# Patient Record
Sex: MALE | Race: WHITE | NOT HISPANIC OR LATINO | Employment: UNEMPLOYED | URBAN - METROPOLITAN AREA
[De-identification: names, ages, dates, MRNs, and addresses within clinical notes are randomized per-mention and may not be internally consistent; named-entity substitution may affect disease eponyms.]

---

## 2017-03-19 PROBLEM — S82.892B OPEN LEFT ANKLE FRACTURE: Status: ACTIVE | Noted: 2017-03-19

## 2018-03-21 PROBLEM — M51.369 DDD (DEGENERATIVE DISC DISEASE), LUMBAR: Status: ACTIVE | Noted: 2018-03-21

## 2018-03-21 PROBLEM — Z78.9 ALCOHOL USE: Status: ACTIVE | Noted: 2018-03-21

## 2018-03-21 PROBLEM — M43.10 PARS DEFECT WITH SPONDYLOLISTHESIS: Status: ACTIVE | Noted: 2018-03-21

## 2018-03-21 PROBLEM — M54.16 LUMBAR RADICULOPATHY: Status: ACTIVE | Noted: 2018-03-21

## 2018-03-21 PROBLEM — Z72.0 TOBACCO USE: Status: ACTIVE | Noted: 2018-03-21

## 2018-03-21 PROBLEM — M51.36 DDD (DEGENERATIVE DISC DISEASE), LUMBAR: Status: ACTIVE | Noted: 2018-03-21

## 2019-01-15 PROBLEM — J93.9 PNEUMOTHORAX: Status: ACTIVE | Noted: 2019-01-15

## 2019-01-15 PROBLEM — S22.49XA RIB FRACTURES: Status: ACTIVE | Noted: 2019-01-15

## 2020-02-07 PROBLEM — F10.10 ALCOHOL ABUSE: Chronic | Status: ACTIVE | Noted: 2018-03-21

## 2020-02-07 PROBLEM — R74.01 TRANSAMINITIS: Status: ACTIVE | Noted: 2020-02-07

## 2020-02-07 PROBLEM — E83.42 HYPOMAGNESEMIA: Status: ACTIVE | Noted: 2020-02-07

## 2020-02-07 PROBLEM — F10.929 ALCOHOL INTOXICATION (HCC): Status: ACTIVE | Noted: 2020-02-07

## 2020-02-07 PROBLEM — R79.89 ELEVATED LFTS: Status: ACTIVE | Noted: 2020-02-07

## 2020-02-07 PROBLEM — D75.89 MACROCYTOSIS: Status: ACTIVE | Noted: 2020-02-07

## 2020-02-07 PROBLEM — S22.49XA RIB FRACTURES: Status: RESOLVED | Noted: 2019-01-15 | Resolved: 2020-02-07

## 2020-02-07 PROBLEM — S82.892B OPEN LEFT ANKLE FRACTURE: Status: RESOLVED | Noted: 2017-03-19 | Resolved: 2020-02-07

## 2020-02-07 PROBLEM — D75.89 MACROCYTOSIS: Chronic | Status: ACTIVE | Noted: 2020-02-07

## 2020-02-07 PROBLEM — K85.90 PANCREATITIS: Status: ACTIVE | Noted: 2020-02-07

## 2020-02-07 PROBLEM — D69.6 THROMBOCYTOPENIA (HCC): Chronic | Status: ACTIVE | Noted: 2020-02-07

## 2020-02-08 PROBLEM — R16.0 HEPATOMEGALY: Chronic | Status: ACTIVE | Noted: 2020-02-08

## 2020-02-08 PROBLEM — J93.9 PNEUMOTHORAX: Status: RESOLVED | Noted: 2019-01-15 | Resolved: 2020-02-08

## 2020-02-08 PROBLEM — K85.20 ALCOHOLIC PANCREATITIS: Status: ACTIVE | Noted: 2020-02-07

## 2020-02-08 PROBLEM — F10.929 ALCOHOL INTOXICATION (HCC): Status: RESOLVED | Noted: 2020-02-07 | Resolved: 2020-02-08

## 2020-03-26 PROBLEM — R33.9 URINARY RETENTION: Status: ACTIVE | Noted: 2020-03-26

## 2020-03-30 PROBLEM — F10.931 ALCOHOL WITHDRAWAL SYNDROME, WITH DELIRIUM (HCC): Status: ACTIVE | Noted: 2020-03-30

## 2020-07-04 PROBLEM — R74.01 TRANSAMINITIS: Status: RESOLVED | Noted: 2020-02-07 | Resolved: 2020-07-04

## 2020-07-04 PROBLEM — F10.931 ALCOHOL WITHDRAWAL SYNDROME, WITH DELIRIUM (HCC): Status: RESOLVED | Noted: 2020-03-30 | Resolved: 2020-07-04

## 2020-07-04 PROBLEM — K85.20 ALCOHOLIC PANCREATITIS: Status: RESOLVED | Noted: 2020-02-07 | Resolved: 2020-07-04

## 2020-07-26 PROBLEM — F10.930 ALCOHOL WITHDRAWAL SYNDROME WITHOUT COMPLICATION (HCC): Status: ACTIVE | Noted: 2020-03-30

## 2020-07-26 PROBLEM — R79.89 ELEVATED LACTIC ACID LEVEL: Status: ACTIVE | Noted: 2020-07-26

## 2020-07-28 PROBLEM — F10.930 ALCOHOL WITHDRAWAL SYNDROME WITHOUT COMPLICATION (HCC): Status: RESOLVED | Noted: 2020-03-30 | Resolved: 2020-07-28

## 2020-07-28 PROBLEM — R79.89 ELEVATED LACTIC ACID LEVEL: Status: RESOLVED | Noted: 2020-07-26 | Resolved: 2020-07-28

## 2021-01-26 PROBLEM — E83.42 HYPOMAGNESEMIA: Status: RESOLVED | Noted: 2020-02-07 | Resolved: 2021-01-26

## 2021-01-26 PROBLEM — K85.20 ALCOHOL-INDUCED ACUTE PANCREATITIS WITHOUT INFECTION OR NECROSIS: Status: RESOLVED | Noted: 2020-02-07 | Resolved: 2021-01-26

## 2021-04-21 PROBLEM — K85.90 PANCREATITIS: Status: ACTIVE | Noted: 2021-04-21

## 2021-05-17 PROBLEM — T81.49XA SURGICAL WOUND INFECTION: Status: ACTIVE | Noted: 2021-05-17

## 2021-05-19 PROBLEM — R33.9 RETENTION OF URINE: Status: RESOLVED | Noted: 2020-03-26 | Resolved: 2021-05-19

## 2021-12-20 PROBLEM — K85.90 ACUTE PANCREATITIS WITHOUT INFECTION OR NECROSIS: Status: ACTIVE | Noted: 2021-12-20

## 2021-12-30 PROBLEM — F10.20 ALCOHOL USE DISORDER, SEVERE, DEPENDENCE (HCC): Status: ACTIVE | Noted: 2021-12-30

## 2021-12-30 PROBLEM — K85.90 PANCREATITIS: Status: RESOLVED | Noted: 2021-04-21 | Resolved: 2021-12-30

## 2021-12-30 PROBLEM — K85.90 ACUTE PANCREATITIS WITHOUT INFECTION OR NECROSIS: Status: RESOLVED | Noted: 2021-12-20 | Resolved: 2021-12-30

## 2021-12-30 PROBLEM — K85.90 RECURRENT ACUTE PANCREATITIS: Status: ACTIVE | Noted: 2021-12-30

## 2021-12-30 PROBLEM — T81.49XA SURGICAL WOUND INFECTION: Status: RESOLVED | Noted: 2021-05-17 | Resolved: 2021-12-30

## 2021-12-30 PROBLEM — D69.6 THROMBOCYTOPENIA (HCC): Chronic | Status: RESOLVED | Noted: 2020-02-07 | Resolved: 2021-12-30

## 2021-12-30 PROBLEM — R74.01 TRANSAMINITIS: Status: RESOLVED | Noted: 2020-02-07 | Resolved: 2021-12-30

## 2021-12-30 PROBLEM — K85.20 ALCOHOL-INDUCED ACUTE PANCREATITIS WITHOUT INFECTION OR NECROSIS: Status: RESOLVED | Noted: 2020-02-07 | Resolved: 2021-12-30

## 2021-12-30 PROBLEM — F10.10 ALCOHOL ABUSE: Chronic | Status: RESOLVED | Noted: 2018-03-21 | Resolved: 2021-12-30

## 2021-12-30 PROBLEM — D75.89 MACROCYTOSIS: Chronic | Status: RESOLVED | Noted: 2020-02-07 | Resolved: 2021-12-30

## 2021-12-30 PROBLEM — R16.0 HEPATOMEGALY: Chronic | Status: RESOLVED | Noted: 2020-02-08 | Resolved: 2021-12-30

## 2022-05-02 PROBLEM — K85.90 ACUTE PANCREATITIS WITHOUT INFECTION OR NECROSIS: Status: ACTIVE | Noted: 2022-05-02

## 2022-06-09 PROBLEM — K85.20 ALCOHOL-INDUCED ACUTE PANCREATITIS, UNSPECIFIED COMPLICATION STATUS: Status: ACTIVE | Noted: 2022-06-09

## 2022-09-15 PROBLEM — K85.80 OTHER ACUTE PANCREATITIS WITHOUT INFECTION OR NECROSIS: Status: ACTIVE | Noted: 2022-09-15

## 2022-09-15 PROBLEM — K85.80 OTHER ACUTE PANCREATITIS WITHOUT INFECTION OR NECROSIS: Status: RESOLVED | Noted: 2022-09-15 | Resolved: 2022-09-15

## 2022-11-08 PROBLEM — F41.9 ANXIETY AND DEPRESSION: Status: ACTIVE | Noted: 2022-11-08

## 2022-11-08 PROBLEM — F32.A ANXIETY AND DEPRESSION: Status: ACTIVE | Noted: 2022-11-08

## 2022-12-03 PROBLEM — R11.2 N&V (NAUSEA AND VOMITING): Status: ACTIVE | Noted: 2022-12-03

## 2022-12-03 PROBLEM — K85.20 ALCOHOL INDUCED ACUTE PANCREATITIS WITHOUT NECROSIS OR INFECTION: Status: ACTIVE | Noted: 2022-12-03

## 2022-12-04 PROBLEM — K85.20 ALCOHOL INDUCED ACUTE PANCREATITIS WITHOUT NECROSIS OR INFECTION: Status: RESOLVED | Noted: 2022-12-03 | Resolved: 2022-12-04

## 2022-12-04 PROBLEM — R11.2 N&V (NAUSEA AND VOMITING): Status: RESOLVED | Noted: 2022-12-03 | Resolved: 2022-12-04

## 2022-12-17 PROBLEM — F10.929 CHRONIC PANCREATITIS DUE TO ACUTE ALCOHOL INTOXICATION (HCC): Status: ACTIVE | Noted: 2022-12-17

## 2022-12-17 PROBLEM — F10.929 CHRONIC PANCREATITIS DUE TO ACUTE ALCOHOL INTOXICATION (HCC): Status: RESOLVED | Noted: 2022-12-17 | Resolved: 2022-12-17

## 2022-12-17 PROBLEM — K86.0 CHRONIC PANCREATITIS DUE TO ACUTE ALCOHOL INTOXICATION (HCC): Status: ACTIVE | Noted: 2022-12-17

## 2022-12-17 PROBLEM — K86.0 CHRONIC PANCREATITIS DUE TO ACUTE ALCOHOL INTOXICATION (HCC): Status: RESOLVED | Noted: 2022-12-17 | Resolved: 2022-12-17
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
## 2023-01-05 PROBLEM — S51.802A OPEN WOUND OF LEFT FOREARM: Status: ACTIVE | Noted: 2023-01-05

## 2023-01-05 PROBLEM — S55.112A LACERATION OF LEFT RADIAL ARTERY: Status: ACTIVE | Noted: 2023-01-05

## 2023-01-05 PROBLEM — F10.920 ALCOHOL INTOXICATION, UNCOMPLICATED (HCC): Status: ACTIVE | Noted: 2023-01-05

## 2023-01-06 PROBLEM — F10.929 ALCOHOL INTOXICATION (HCC): Status: RESOLVED | Noted: 2020-02-07 | Resolved: 2023-01-06

## 2023-01-06 PROBLEM — F10.920 ALCOHOL INTOXICATION, UNCOMPLICATED (HCC): Status: RESOLVED | Noted: 2023-01-05 | Resolved: 2023-01-06

## 2023-01-06 PROBLEM — K85.90 ACUTE PANCREATITIS WITHOUT INFECTION OR NECROSIS: Status: RESOLVED | Noted: 2022-05-02 | Resolved: 2023-01-06

## 2024-01-02 PROBLEM — K86.1 ACUTE ON CHRONIC PANCREATITIS (HCC): Status: ACTIVE | Noted: 2024-01-02

## 2024-01-02 PROBLEM — K85.90 ACUTE ON CHRONIC PANCREATITIS (HCC): Status: ACTIVE | Noted: 2024-01-02

## 2024-04-26 PROBLEM — S92.009A CALCANEUS FRACTURE: Status: ACTIVE | Noted: 2024-04-26

## 2024-05-07 PROBLEM — S92.032A DISPLACED AVULSION FRACTURE OF TUBEROSITY OF LEFT CALCANEUS, INITIAL ENCOUNTER FOR CLOSED FRACTURE: Status: ACTIVE | Noted: 2024-05-07

## 2024-12-29 ENCOUNTER — APPOINTMENT (OUTPATIENT)
Dept: RADIOLOGY | Facility: MEDICAL CENTER | Age: 34
End: 2024-12-29
Attending: INTERNAL MEDICINE
Payer: COMMERCIAL

## 2024-12-29 ENCOUNTER — HOSPITAL ENCOUNTER (INPATIENT)
Facility: MEDICAL CENTER | Age: 34
LOS: 1 days | End: 2024-12-30
Attending: INTERNAL MEDICINE | Admitting: INTERNAL MEDICINE
Payer: COMMERCIAL

## 2024-12-29 DIAGNOSIS — K85.90 ACUTE ON CHRONIC PANCREATITIS (HCC): ICD-10-CM

## 2024-12-29 DIAGNOSIS — K86.1 ACUTE ON CHRONIC PANCREATITIS (HCC): ICD-10-CM

## 2024-12-29 PROBLEM — H54.62 VISION LOSS, LEFT EYE: Status: ACTIVE | Noted: 2024-12-29

## 2024-12-29 PROBLEM — H16.9 KERATITIS, LEFT: Status: ACTIVE | Noted: 2024-12-29

## 2024-12-29 PROBLEM — F19.90 DRUG USE: Status: ACTIVE | Noted: 2024-12-29

## 2024-12-29 PROBLEM — D64.9 ANEMIA: Status: ACTIVE | Noted: 2024-12-29

## 2024-12-29 PROBLEM — H18.829 CONTACT LENS OVERWEAR: Status: ACTIVE | Noted: 2024-12-29

## 2024-12-29 PROBLEM — H16.002 UNSPECIFIED CORNEAL ULCER, LEFT EYE: Status: ACTIVE | Noted: 2024-12-29

## 2024-12-29 PROBLEM — H16.9 KERATITIS, LEFT: Status: RESOLVED | Noted: 2024-12-29 | Resolved: 2024-12-29

## 2024-12-29 PROBLEM — D64.9 ANEMIA: Status: RESOLVED | Noted: 2024-12-29 | Resolved: 2024-12-29

## 2024-12-29 PROBLEM — F19.10 SUBSTANCE ABUSE (HCC): Status: ACTIVE | Noted: 2024-12-29

## 2024-12-29 PROBLEM — Z59.9 FINANCIAL DIFFICULTIES: Status: ACTIVE | Noted: 2024-12-29

## 2024-12-29 LAB
AMPHET UR QL SCN: POSITIVE
BARBITURATES UR QL SCN: NEGATIVE
BENZODIAZ UR QL SCN: NEGATIVE
BZE UR QL SCN: NEGATIVE
CANNABINOIDS UR QL SCN: POSITIVE
FENTANYL UR QL: NEGATIVE
METHADONE UR QL SCN: NEGATIVE
OPIATES UR QL SCN: NEGATIVE
OXYCODONE UR QL SCN: POSITIVE
PCP UR QL SCN: NEGATIVE
PHOSPHATE SERPL-MCNC: 3.3 MG/DL (ref 2.5–4.5)
PROPOXYPH UR QL SCN: NEGATIVE

## 2024-12-29 PROCEDURE — A9270 NON-COVERED ITEM OR SERVICE: HCPCS | Performed by: INTERNAL MEDICINE

## 2024-12-29 PROCEDURE — 700102 HCHG RX REV CODE 250 W/ 637 OVERRIDE(OP): Performed by: INTERNAL MEDICINE

## 2024-12-29 PROCEDURE — 84100 ASSAY OF PHOSPHORUS: CPT

## 2024-12-29 PROCEDURE — 36415 COLL VENOUS BLD VENIPUNCTURE: CPT

## 2024-12-29 PROCEDURE — 700101 HCHG RX REV CODE 250: Performed by: INTERNAL MEDICINE

## 2024-12-29 PROCEDURE — 700105 HCHG RX REV CODE 258

## 2024-12-29 PROCEDURE — 700111 HCHG RX REV CODE 636 W/ 250 OVERRIDE (IP): Performed by: INTERNAL MEDICINE

## 2024-12-29 PROCEDURE — 76705 ECHO EXAM OF ABDOMEN: CPT

## 2024-12-29 PROCEDURE — 700105 HCHG RX REV CODE 258: Performed by: INTERNAL MEDICINE

## 2024-12-29 PROCEDURE — 99232 SBSQ HOSP IP/OBS MODERATE 35: CPT | Performed by: INTERNAL MEDICINE

## 2024-12-29 PROCEDURE — 99223 1ST HOSP IP/OBS HIGH 75: CPT | Performed by: STUDENT IN AN ORGANIZED HEALTH CARE EDUCATION/TRAINING PROGRAM

## 2024-12-29 PROCEDURE — 99406 BEHAV CHNG SMOKING 3-10 MIN: CPT | Performed by: STUDENT IN AN ORGANIZED HEALTH CARE EDUCATION/TRAINING PROGRAM

## 2024-12-29 PROCEDURE — 700111 HCHG RX REV CODE 636 W/ 250 OVERRIDE (IP)

## 2024-12-29 PROCEDURE — 700102 HCHG RX REV CODE 250 W/ 637 OVERRIDE(OP)

## 2024-12-29 PROCEDURE — 80307 DRUG TEST PRSMV CHEM ANLYZR: CPT

## 2024-12-29 PROCEDURE — A9270 NON-COVERED ITEM OR SERVICE: HCPCS

## 2024-12-29 PROCEDURE — 770001 HCHG ROOM/CARE - MED/SURG/GYN PRIV*

## 2024-12-29 PROCEDURE — 700111 HCHG RX REV CODE 636 W/ 250 OVERRIDE (IP): Mod: JZ

## 2024-12-29 RX ORDER — LORAZEPAM 2 MG/ML
1 INJECTION INTRAMUSCULAR
Status: DISCONTINUED | OUTPATIENT
Start: 2024-12-29 | End: 2024-12-29

## 2024-12-29 RX ORDER — SODIUM CHLORIDE, SODIUM LACTATE, POTASSIUM CHLORIDE, CALCIUM CHLORIDE 600; 310; 30; 20 MG/100ML; MG/100ML; MG/100ML; MG/100ML
2000 INJECTION, SOLUTION INTRAVENOUS CONTINUOUS
Status: DISCONTINUED | OUTPATIENT
Start: 2024-12-29 | End: 2024-12-29

## 2024-12-29 RX ORDER — LABETALOL HYDROCHLORIDE 5 MG/ML
10 INJECTION, SOLUTION INTRAVENOUS EVERY 4 HOURS PRN
Status: DISCONTINUED | OUTPATIENT
Start: 2024-12-29 | End: 2024-12-30 | Stop reason: HOSPADM

## 2024-12-29 RX ORDER — ENOXAPARIN SODIUM 100 MG/ML
40 INJECTION SUBCUTANEOUS DAILY
Status: DISCONTINUED | OUTPATIENT
Start: 2024-12-29 | End: 2024-12-30 | Stop reason: HOSPADM

## 2024-12-29 RX ORDER — ACETAMINOPHEN 500 MG
1000 TABLET ORAL EVERY 6 HOURS
Status: DISCONTINUED | OUTPATIENT
Start: 2024-12-29 | End: 2024-12-30 | Stop reason: HOSPADM

## 2024-12-29 RX ORDER — AMOXICILLIN 250 MG
2 CAPSULE ORAL EVERY EVENING
Status: DISCONTINUED | OUTPATIENT
Start: 2024-12-29 | End: 2024-12-30 | Stop reason: HOSPADM

## 2024-12-29 RX ORDER — OXYCODONE HYDROCHLORIDE 5 MG/1
5 TABLET ORAL
Status: DISCONTINUED | OUTPATIENT
Start: 2024-12-29 | End: 2024-12-30 | Stop reason: HOSPADM

## 2024-12-29 RX ORDER — GAUZE BANDAGE 2" X 2"
200 BANDAGE TOPICAL 2 TIMES DAILY
Status: DISCONTINUED | OUTPATIENT
Start: 2025-01-01 | End: 2024-12-30 | Stop reason: HOSPADM

## 2024-12-29 RX ORDER — LORAZEPAM 2 MG/ML
2 INJECTION INTRAMUSCULAR
Status: DISCONTINUED | OUTPATIENT
Start: 2024-12-29 | End: 2024-12-29

## 2024-12-29 RX ORDER — LORAZEPAM 2 MG/ML
1.5 INJECTION INTRAMUSCULAR
Status: DISCONTINUED | OUTPATIENT
Start: 2024-12-29 | End: 2024-12-29

## 2024-12-29 RX ORDER — FOLIC ACID 1 MG/1
1 TABLET ORAL DAILY
Status: DISCONTINUED | OUTPATIENT
Start: 2024-12-29 | End: 2024-12-29

## 2024-12-29 RX ORDER — LORAZEPAM 1 MG/1
1 TABLET ORAL EVERY 4 HOURS PRN
Status: DISCONTINUED | OUTPATIENT
Start: 2024-12-29 | End: 2024-12-29

## 2024-12-29 RX ORDER — ACETAMINOPHEN 325 MG/1
650 TABLET ORAL EVERY 6 HOURS PRN
Status: DISCONTINUED | OUTPATIENT
Start: 2024-12-29 | End: 2024-12-29

## 2024-12-29 RX ORDER — POLYETHYLENE GLYCOL 3350 17 G/17G
1 POWDER, FOR SOLUTION ORAL
Status: DISCONTINUED | OUTPATIENT
Start: 2024-12-29 | End: 2024-12-30 | Stop reason: HOSPADM

## 2024-12-29 RX ORDER — LORAZEPAM 2 MG/1
4 TABLET ORAL
Status: DISCONTINUED | OUTPATIENT
Start: 2024-12-29 | End: 2024-12-29

## 2024-12-29 RX ORDER — LORAZEPAM 2 MG/ML
0.5 INJECTION INTRAMUSCULAR EVERY 4 HOURS PRN
Status: DISCONTINUED | OUTPATIENT
Start: 2024-12-29 | End: 2024-12-29

## 2024-12-29 RX ORDER — NICOTINE 21 MG/24HR
21 PATCH, TRANSDERMAL 24 HOURS TRANSDERMAL
Status: DISCONTINUED | OUTPATIENT
Start: 2024-12-29 | End: 2024-12-30 | Stop reason: HOSPADM

## 2024-12-29 RX ORDER — ACETAMINOPHEN 500 MG
1000 TABLET ORAL EVERY 6 HOURS PRN
Status: DISCONTINUED | OUTPATIENT
Start: 2025-01-03 | End: 2024-12-30 | Stop reason: HOSPADM

## 2024-12-29 RX ORDER — LORAZEPAM 2 MG/1
2 TABLET ORAL
Status: DISCONTINUED | OUTPATIENT
Start: 2024-12-29 | End: 2024-12-29

## 2024-12-29 RX ORDER — HYDROMORPHONE HYDROCHLORIDE 1 MG/ML
0.5 INJECTION, SOLUTION INTRAMUSCULAR; INTRAVENOUS; SUBCUTANEOUS
Status: DISCONTINUED | OUTPATIENT
Start: 2024-12-29 | End: 2024-12-30 | Stop reason: HOSPADM

## 2024-12-29 RX ORDER — SODIUM CHLORIDE, SODIUM LACTATE, POTASSIUM CHLORIDE, CALCIUM CHLORIDE 600; 310; 30; 20 MG/100ML; MG/100ML; MG/100ML; MG/100ML
INJECTION, SOLUTION INTRAVENOUS CONTINUOUS
Status: DISCONTINUED | OUTPATIENT
Start: 2024-12-29 | End: 2024-12-30

## 2024-12-29 RX ORDER — MOXIFLOXACIN 5 MG/ML
1 SOLUTION/ DROPS OPHTHALMIC
Status: DISCONTINUED | OUTPATIENT
Start: 2024-12-29 | End: 2024-12-29

## 2024-12-29 RX ORDER — GAUZE BANDAGE 2" X 2"
100 BANDAGE TOPICAL DAILY
Status: DISCONTINUED | OUTPATIENT
Start: 2024-12-29 | End: 2024-12-29

## 2024-12-29 RX ORDER — OXYCODONE HYDROCHLORIDE 5 MG/1
5 TABLET ORAL
Status: DISCONTINUED | OUTPATIENT
Start: 2024-12-29 | End: 2024-12-29

## 2024-12-29 RX ORDER — OXYCODONE HYDROCHLORIDE 5 MG/1
2.5 TABLET ORAL
Status: DISCONTINUED | OUTPATIENT
Start: 2024-12-29 | End: 2024-12-29

## 2024-12-29 RX ORDER — LORAZEPAM 1 MG/1
0.5 TABLET ORAL EVERY 4 HOURS PRN
Status: DISCONTINUED | OUTPATIENT
Start: 2024-12-29 | End: 2024-12-29

## 2024-12-29 RX ORDER — MOXIFLOXACIN 5 MG/ML
1 SOLUTION/ DROPS OPHTHALMIC
Status: DISCONTINUED | OUTPATIENT
Start: 2024-12-29 | End: 2024-12-30 | Stop reason: HOSPADM

## 2024-12-29 RX ORDER — ERYTHROMYCIN 5 MG/G
1 OINTMENT OPHTHALMIC
Status: DISCONTINUED | OUTPATIENT
Start: 2024-12-29 | End: 2024-12-30 | Stop reason: HOSPADM

## 2024-12-29 RX ORDER — HYDROMORPHONE HYDROCHLORIDE 1 MG/ML
0.25 INJECTION, SOLUTION INTRAMUSCULAR; INTRAVENOUS; SUBCUTANEOUS
Status: DISCONTINUED | OUTPATIENT
Start: 2024-12-29 | End: 2024-12-29

## 2024-12-29 RX ORDER — TOBRAMYCIN 3 MG/ML
1 SOLUTION/ DROPS OPHTHALMIC EVERY 4 HOURS
Status: DISCONTINUED | OUTPATIENT
Start: 2024-12-29 | End: 2024-12-29

## 2024-12-29 RX ORDER — OXYCODONE HYDROCHLORIDE 10 MG/1
10 TABLET ORAL
Status: DISCONTINUED | OUTPATIENT
Start: 2024-12-29 | End: 2024-12-30 | Stop reason: HOSPADM

## 2024-12-29 RX ADMIN — MOXIFLOXACIN 1 DROP: 5 SOLUTION/ DROPS OPHTHALMIC at 18:44

## 2024-12-29 RX ADMIN — HYDROMORPHONE HYDROCHLORIDE 0.5 MG: 1 INJECTION, SOLUTION INTRAMUSCULAR; INTRAVENOUS; SUBCUTANEOUS at 08:40

## 2024-12-29 RX ADMIN — OXYCODONE 5 MG: 5 TABLET ORAL at 07:36

## 2024-12-29 RX ADMIN — OXYCODONE 5 MG: 5 TABLET ORAL at 04:14

## 2024-12-29 RX ADMIN — ENOXAPARIN SODIUM 40 MG: 100 INJECTION SUBCUTANEOUS at 02:47

## 2024-12-29 RX ADMIN — HYDROMORPHONE HYDROCHLORIDE 0.5 MG: 1 INJECTION, SOLUTION INTRAMUSCULAR; INTRAVENOUS; SUBCUTANEOUS at 13:18

## 2024-12-29 RX ADMIN — ENOXAPARIN SODIUM 40 MG: 100 INJECTION SUBCUTANEOUS at 16:20

## 2024-12-29 RX ADMIN — OXYCODONE HYDROCHLORIDE 10 MG: 10 TABLET ORAL at 16:16

## 2024-12-29 RX ADMIN — FOLIC ACID 1 MG: 1 TABLET ORAL at 05:04

## 2024-12-29 RX ADMIN — NICOTINE TRANSDERMAL SYSTEM 21 MG: 21 PATCH, EXTENDED RELEASE TRANSDERMAL at 09:40

## 2024-12-29 RX ADMIN — OXYCODONE HYDROCHLORIDE 10 MG: 10 TABLET ORAL at 20:43

## 2024-12-29 RX ADMIN — MOXIFLOXACIN 1 DROP: 5 SOLUTION/ DROPS OPHTHALMIC at 16:16

## 2024-12-29 RX ADMIN — OXYCODONE HYDROCHLORIDE 10 MG: 10 TABLET ORAL at 12:04

## 2024-12-29 RX ADMIN — Medication 100 MG: at 05:05

## 2024-12-29 RX ADMIN — THERA TABS 1 TABLET: TAB at 08:24

## 2024-12-29 RX ADMIN — SODIUM CHLORIDE, POTASSIUM CHLORIDE, SODIUM LACTATE AND CALCIUM CHLORIDE: 600; 310; 30; 20 INJECTION, SOLUTION INTRAVENOUS at 16:19

## 2024-12-29 RX ADMIN — HYDROMORPHONE HYDROCHLORIDE 0.25 MG: 1 INJECTION, SOLUTION INTRAMUSCULAR; INTRAVENOUS; SUBCUTANEOUS at 05:22

## 2024-12-29 RX ADMIN — THIAMINE HYDROCHLORIDE 400 MG: 100 INJECTION, SOLUTION INTRAMUSCULAR; INTRAVENOUS at 16:20

## 2024-12-29 RX ADMIN — MOXIFLOXACIN 1 DROP: 5 SOLUTION/ DROPS OPHTHALMIC at 22:21

## 2024-12-29 RX ADMIN — THERA TABS 1 TABLET: TAB at 05:04

## 2024-12-29 RX ADMIN — ERYTHROMYCIN 1 APPLICATION: 5 OINTMENT OPHTHALMIC at 20:43

## 2024-12-29 RX ADMIN — SODIUM CHLORIDE, POTASSIUM CHLORIDE, SODIUM LACTATE AND CALCIUM CHLORIDE 1000 ML: 600; 310; 30; 20 INJECTION, SOLUTION INTRAVENOUS at 03:38

## 2024-12-29 RX ADMIN — MOXIFLOXACIN 1 DROP: 5 SOLUTION/ DROPS OPHTHALMIC at 13:35

## 2024-12-29 RX ADMIN — THIAMINE HYDROCHLORIDE 400 MG: 100 INJECTION, SOLUTION INTRAMUSCULAR; INTRAVENOUS at 08:27

## 2024-12-29 RX ADMIN — THIAMINE HYDROCHLORIDE 400 MG: 100 INJECTION, SOLUTION INTRAMUSCULAR; INTRAVENOUS at 20:47

## 2024-12-29 RX ADMIN — SODIUM CHLORIDE, POTASSIUM CHLORIDE, SODIUM LACTATE AND CALCIUM CHLORIDE: 600; 310; 30; 20 INJECTION, SOLUTION INTRAVENOUS at 08:21

## 2024-12-29 RX ADMIN — TOBRAMYCIN OPHTHALMIC SOLUTION 1 DROP: 3 SOLUTION/ DROPS OPHTHALMIC at 08:22

## 2024-12-29 SDOH — ECONOMIC STABILITY: TRANSPORTATION INSECURITY
IN THE PAST 12 MONTHS, HAS LACK OF RELIABLE TRANSPORTATION KEPT YOU FROM MEDICAL APPOINTMENTS, MEETINGS, WORK OR FROM GETTING THINGS NEEDED FOR DAILY LIVING?: NO

## 2024-12-29 SDOH — ECONOMIC STABILITY: TRANSPORTATION INSECURITY
IN THE PAST 12 MONTHS, HAS THE LACK OF TRANSPORTATION KEPT YOU FROM MEDICAL APPOINTMENTS OR FROM GETTING MEDICATIONS?: NO

## 2024-12-29 ASSESSMENT — COGNITIVE AND FUNCTIONAL STATUS - GENERAL
SUGGESTED CMS G CODE MODIFIER MOBILITY: CH
MOBILITY SCORE: 24
SUGGESTED CMS G CODE MODIFIER DAILY ACTIVITY: CH
DAILY ACTIVITIY SCORE: 24

## 2024-12-29 ASSESSMENT — LIFESTYLE VARIABLES
AVERAGE NUMBER OF DAYS PER WEEK YOU HAVE A DRINK CONTAINING ALCOHOL: 2
TOTAL SCORE: 3
TOTAL SCORE: 1
AGITATION: NORMAL ACTIVITY
NAUSEA AND VOMITING: MILD NAUSEA WITH NO VOMITING
AUDITORY DISTURBANCES: NOT PRESENT
TOTAL SCORE: 3
HEADACHE, FULLNESS IN HEAD: NOT PRESENT
ANXIETY: NO ANXIETY (AT EASE)
VISUAL DISTURBANCES: NOT PRESENT
TOTAL SCORE: 2
ORIENTATION AND CLOUDING OF SENSORIUM: ORIENTED AND CAN DO SERIAL ADDITIONS
ANXIETY: MILDLY ANXIOUS
TOTAL SCORE: 1
TOTAL SCORE: 1
ALCOHOL_USE: YES
HEADACHE, FULLNESS IN HEAD: VERY MILD
TOTAL SCORE: 2
ON A TYPICAL DAY WHEN YOU DRINK ALCOHOL HOW MANY DRINKS DO YOU HAVE: 2
NAUSEA AND VOMITING: NO NAUSEA AND NO VOMITING
HEADACHE, FULLNESS IN HEAD: NOT PRESENT
AUDITORY DISTURBANCES: NOT PRESENT
AUDITORY DISTURBANCES: NOT PRESENT
HAVE PEOPLE ANNOYED YOU BY CRITICIZING YOUR DRINKING: YES
HEADACHE, FULLNESS IN HEAD: NOT PRESENT
VISUAL DISTURBANCES: NOT PRESENT
ANXIETY: NO ANXIETY (AT EASE)
NAUSEA AND VOMITING: NO NAUSEA AND NO VOMITING
PAROXYSMAL SWEATS: NO SWEAT VISIBLE
AUDITORY DISTURBANCES: NOT PRESENT
PAROXYSMAL SWEATS: NO SWEAT VISIBLE
VISUAL DISTURBANCES: NOT PRESENT
AGITATION: SOMEWHAT MORE THAN NORMAL ACTIVITY
AGITATION: SOMEWHAT MORE THAN NORMAL ACTIVITY
HAVE YOU EVER FELT YOU SHOULD CUT DOWN ON YOUR DRINKING: YES
VISUAL DISTURBANCES: NOT PRESENT
HOW MANY TIMES IN THE PAST YEAR HAVE YOU HAD 5 OR MORE DRINKS IN A DAY: 200
ANXIETY: MILDLY ANXIOUS
ORIENTATION AND CLOUDING OF SENSORIUM: ORIENTED AND CAN DO SERIAL ADDITIONS
ORIENTATION AND CLOUDING OF SENSORIUM: ORIENTED AND CAN DO SERIAL ADDITIONS
TOTAL SCORE: 2
AUDITORY DISTURBANCES: NOT PRESENT
HEADACHE, FULLNESS IN HEAD: NOT PRESENT
TREMOR: NO TREMOR
NAUSEA AND VOMITING: NO NAUSEA AND NO VOMITING
AGITATION: NORMAL ACTIVITY
AUDITORY DISTURBANCES: NOT PRESENT
TREMOR: NO TREMOR
TREMOR: NO TREMOR
PAROXYSMAL SWEATS: NO SWEAT VISIBLE
AGITATION: NORMAL ACTIVITY
EVER HAD A DRINK FIRST THING IN THE MORNING TO STEADY YOUR NERVES TO GET RID OF A HANGOVER: NO
TOTAL SCORE: 3
EVER FELT BAD OR GUILTY ABOUT YOUR DRINKING: YES
TREMOR: TREMOR NOT VISIBLE BUT CAN BE FELT, FINGERTIP TO FINGERTIP
NAUSEA AND VOMITING: NO NAUSEA AND NO VOMITING
PAROXYSMAL SWEATS: NO SWEAT VISIBLE
CONSUMPTION TOTAL: POSITIVE
PAROXYSMAL SWEATS: NO SWEAT VISIBLE
HEADACHE, FULLNESS IN HEAD: NOT PRESENT
DOES PATIENT WANT TO STOP DRINKING: NO
ANXIETY: NO ANXIETY (AT EASE)
AGITATION: SOMEWHAT MORE THAN NORMAL ACTIVITY
ORIENTATION AND CLOUDING OF SENSORIUM: ORIENTED AND CAN DO SERIAL ADDITIONS
VISUAL DISTURBANCES: NOT PRESENT
ORIENTATION AND CLOUDING OF SENSORIUM: ORIENTED AND CAN DO SERIAL ADDITIONS
PAROXYSMAL SWEATS: NO SWEAT VISIBLE
NAUSEA AND VOMITING: NO NAUSEA AND NO VOMITING
ORIENTATION AND CLOUDING OF SENSORIUM: ORIENTED AND CAN DO SERIAL ADDITIONS
TREMOR: NO TREMOR
TREMOR: NO TREMOR
VISUAL DISTURBANCES: NOT PRESENT
ANXIETY: MILDLY ANXIOUS

## 2024-12-29 ASSESSMENT — ENCOUNTER SYMPTOMS
SHORTNESS OF BREATH: 0
EYE DISCHARGE: 0
HEADACHES: 1
WEAKNESS: 0
FALLS: 0
ABDOMINAL PAIN: 0
BLOOD IN STOOL: 0
ABDOMINAL PAIN: 1
NAUSEA: 0
NEUROLOGICAL NEGATIVE: 1
BLURRED VISION: 1
PALPITATIONS: 0
COUGH: 0
EYE REDNESS: 1
DEPRESSION: 0
VOMITING: 1
SEIZURES: 0
RESPIRATORY NEGATIVE: 1
PHOTOPHOBIA: 1
SORE THROAT: 0
EYE PAIN: 1
HALLUCINATIONS: 0
MUSCULOSKELETAL NEGATIVE: 1
CARDIOVASCULAR NEGATIVE: 1
DIARRHEA: 0
EYE DISCHARGE: 1
CHILLS: 0
VOMITING: 0
NAUSEA: 1
CONSTIPATION: 0
LOSS OF CONSCIOUSNESS: 0
FEVER: 0
BRUISES/BLEEDS EASILY: 0
PSYCHIATRIC NEGATIVE: 1

## 2024-12-29 ASSESSMENT — FIBROSIS 4 INDEX: FIB4 SCORE: 0.93

## 2024-12-29 ASSESSMENT — PATIENT HEALTH QUESTIONNAIRE - PHQ9
1. LITTLE INTEREST OR PLEASURE IN DOING THINGS: NOT AT ALL
SUM OF ALL RESPONSES TO PHQ9 QUESTIONS 1 AND 2: 0
2. FEELING DOWN, DEPRESSED, IRRITABLE, OR HOPELESS: NOT AT ALL

## 2024-12-29 ASSESSMENT — SOCIAL DETERMINANTS OF HEALTH (SDOH)
WITHIN THE LAST YEAR, HAVE YOU BEEN KICKED, HIT, SLAPPED, OR OTHERWISE PHYSICALLY HURT BY YOUR PARTNER OR EX-PARTNER?: PATIENT DECLINED
WITHIN THE PAST 12 MONTHS, YOU WORRIED THAT YOUR FOOD WOULD RUN OUT BEFORE YOU GOT THE MONEY TO BUY MORE: SOMETIMES TRUE
IN THE PAST 12 MONTHS, HAS THE ELECTRIC, GAS, OIL, OR WATER COMPANY THREATENED TO SHUT OFF SERVICE IN YOUR HOME?: YES
WITHIN THE LAST YEAR, HAVE YOU BEEN AFRAID OF YOUR PARTNER OR EX-PARTNER?: PATIENT DECLINED
WITHIN THE PAST 12 MONTHS, THE FOOD YOU BOUGHT JUST DIDN'T LAST AND YOU DIDN'T HAVE MONEY TO GET MORE: SOMETIMES TRUE
WITHIN THE LAST YEAR, HAVE TO BEEN RAPED OR FORCED TO HAVE ANY KIND OF SEXUAL ACTIVITY BY YOUR PARTNER OR EX-PARTNER?: PATIENT DECLINED
WITHIN THE LAST YEAR, HAVE YOU BEEN HUMILIATED OR EMOTIONALLY ABUSED IN OTHER WAYS BY YOUR PARTNER OR EX-PARTNER?: PATIENT DECLINED

## 2024-12-29 ASSESSMENT — PAIN DESCRIPTION - PAIN TYPE
TYPE: ACUTE PAIN

## 2024-12-29 NOTE — ASSESSMENT & PLAN NOTE
Patient reports using drugs. Mentioned injecting himself heroin to help with the pain prior admission.   History of alcohol abuse, currently drinking less, but not quit at all. Last Drink 12/28 (two shots of whiskey)  Smokes 1/2 pack a day  Plan:  -Refer to Case Management. Patient might benefit from addiction counseling and support groups (e.g., Alcoholics Anonymous).  -Nicotine patch

## 2024-12-29 NOTE — ASSESSMENT & PLAN NOTE
Assessment: Financial limitations impacting follow-up care and medication adherence.  Plan:  -Connect with  or case management to explore financial assistance options.  -Discuss patient assistance programs for medications.  -Provide information on community resources for low-cost or free healthcare services.

## 2024-12-29 NOTE — HOSPITAL COURSE
Gay Storm is a 34 years old male who recently presented with epigastric abdominal pain. He mentioned that he has been drinking less that usual but had two shots of whiskey the day of admission. He rated the pain as 7/10 constant and radiating to the back, similar to previous episodes, that intensified the day of admission. He has also been having nausea for 2 weeks and decreased appetite for a week but denies vomiting, chest pain, dyspnea, fever, or hematochezia. .    He has a past medical history of chronic alcoholic pancreatitis with several admissions for acute pancreatitis due to alcohol. On further chart review, there were multiples ED visits without admission per patients preference to not get admitted. Previous abdominal CT scans show chronic changes of chronic pancreatitis.     His other concern was severe left eye pain and vision loss. The patient initially visited [facility]'s emergency department on December 18th, reporting left eye pain and requesting a wound check. At that time, he was diagnosed with a left corneal ulcer and prescribed ofloxacin eye drops. Now experiences significant redness, pain, and vision loss, describing it as a cloud over his vision in the left eye. He has not been able to follow up with ophthalmology or optometry due to financial constraints. He previously wore contact lenses but has not used a contact in his left eye since his initial ED visit for the corneal ulcer.     In the ED lipase was 857 and alcohol levels 0.14 and was admitted for acute pancreatitis management with clear liquid diet, narcotics for pain and IV fluids. He was also placed on CIWA protocols with multivitamins. Ophthalmology was consulted and mentioned that the cause was contact lenses overwear and poor hygiene, discontinued the anesthetic and started on moxifloxacin and erythromycin eye drops and recommended follow up with him a day after discharge, patient continued to have watery discharge and  Vasc 20/800 at near on the left eye. Patient's pain improved and advanced diet as tolerated. RUQ US did not show gallstones. Instructed that the likely cause of the acute on chronic pancreatitis episodes are related to alcohol, patient understood and mentioned that will stop drinking.

## 2024-12-29 NOTE — PROGRESS NOTES
[facility] Internal Medicine Daily Progress Note    Date of Service  12/29/2024    [facility] Team: R IM [facility] Team   Attending: Olayinka Ramirez M.d.  Senior Resident: Dr. Corral  Intern:  Dr. Fish  Contact Number: [phone]    Chief Complaint  Gay Storm is a 34 y.o. male admitted 12/29/2024 with abdominal pain and visual loss on the left eye.    Hospital Course  Gay Storm is a 34 years old male who recently presented with epigastric abdominal pain. He mentioned that he has been drinking less that usual but had two shots of whiskey the day of admission. He rated the pain as 7/10 constant and radiating to the back, similar to previous episodes, that intensified the day of admission. He has also been having nausea for 2 weeks and decreased appetite for a week but denies vomiting, chest pain, dyspnea, fever, or hematochezia. .    He has a past medical history of chronic alcoholic pancreatitis with several admissions for acute pancreatitis due to alcohol. On further chart review, there were multiples ED visits without admission per patients preference to not get admitted. Previous abdominal CT scans show chronic changes of chronic pancreatitis.     His other concern was severe left eye pain and vision loss. The patient initially visited [facility]'s emergency department on December 18th, reporting left eye pain and requesting a wound check. At that time, he was diagnosed with a left corneal ulcer and prescribed ofloxacin eye drops. Now experiences significant redness, pain, and vision loss, describing it as a cloud over his vision in the left eye. He has not been able to follow up with ophthalmology or optometry due to financial constraints. He previously wore contact lenses but has not used a contact in his left eye since his initial ED visit for the corneal ulcer.     In the ED lipase was 857 and alcohol levels 0.14 and was admitted for acute pancreatitis management with clear liquid diet, narcotics for  pain and IV fluids. He was also placed on CIWA protocols with multivitamins and ophthalmology was consulted.    Interval Problem Update  Vitals stable.  Patient complaints of constant 7/10 epigastric pain since admission. He mentioned that he responds well to dilaudid 0.5 mg. Upon chart review, the patient has been getting high doses of steroids on previous admissions due to pain. He also said that he injected himself heroin to help the pain prior admission.  He said that he ate a sandwich in the ED, but since then clear liquid diet. He denied agitation or tremors. On physical examination the patient has guarding and diffuse abdominal tenderness worse in the epigastrium. He agreed to continue with narcotics and clear liquid diet and understands the situation. Also agreed to stop drinking and mentioned that he has been cutting down for the past 6 months and does not have cravings.    He also said that he has left eye pain and cloudiness, there's bilateral conjunctival injection, he can also only see shapes with the left eye. He has been using ofloxacin and proparacaine drops prescribed by the ED on 12/18. He uses contact lenses. Visual acuity was 20/25 on the right eye and <20/200 on the left. Ophthalmology was consulted and we are following their recommendations. Appreciated.     I have discussed this patient's plan of care and discharge plan at IDT rounds today with Case Management, Nursing, Nursing leadership, and other members of the IDT team.    Consultants/Specialty  Ophthalmology    Code Status  Full Code    Disposition  The patient is not medically cleared for discharge to home or a post-acute facility.      I have placed the appropriate orders for post-discharge needs.    Review of Systems  Review of Systems   Constitutional:  Positive for malaise/fatigue. Negative for fever.   HENT: Negative.     Eyes:  Positive for blurred vision, photophobia, pain, discharge and redness.   Respiratory: Negative.      Cardiovascular: Negative.    Gastrointestinal:  Positive for abdominal pain, nausea and vomiting. Negative for blood in stool, constipation and diarrhea.   Genitourinary: Negative.    Musculoskeletal: Negative.    Skin: Negative.    Neurological: Negative.    Endo/Heme/Allergies: Negative.    Psychiatric/Behavioral: Negative.          Physical Exam  Temp:  [36.5 °C (97.7 °F)-36.7 °C (98.1 °F)] 36.7 °C (98.1 °F)  Pulse:  [53-95] 73  Resp:  [17-18] 17  BP: (115-125)/(71-83) 125/78  SpO2:  [96 %-100 %] 98 %    Physical Exam  Constitutional:       Appearance: Normal appearance. He is normal weight.   HENT:      Head: Normocephalic.      Nose: Nose normal.      Mouth/Throat:      Pharynx: Oropharynx is clear.   Eyes:      General:         Left eye: Discharge present.     Comments: Left eye pain and cloudiness, there's bilateral conjunctival injection, he can also only see shapes with the left eye.   He uses contact lenses.   Visual acuity was 20/25 on the right eye and <20/200 on the left.   Cardiovascular:      Rate and Rhythm: Normal rate and regular rhythm.   Pulmonary:      Effort: Pulmonary effort is normal.      Breath sounds: Normal breath sounds.   Abdominal:      General: Abdomen is flat. Bowel sounds are normal. There is no distension.      Palpations: There is no mass.      Tenderness: There is abdominal tenderness (Epigastrium, but diffuse). There is guarding. There is no rebound.   Musculoskeletal:         General: Normal range of motion.      Cervical back: Normal range of motion.   Skin:     General: Skin is warm.      Capillary Refill: Capillary refill takes less than 2 seconds.   Neurological:      General: No focal deficit present.      Mental Status: He is alert and oriented to person, place, and time. Mental status is at baseline.   Psychiatric:         Mood and Affect: Mood normal.         Behavior: Behavior normal.         Thought Content: Thought content normal.         Judgment: Judgment normal.          Fluids    Intake/Output Summary (Last 24 hours) at 12/29/2024 1451  Last data filed at 12/29/2024 1200  Gross per 24 hour   Intake 640 ml   Output 320 ml   Net 320 ml       Laboratory  Recent Labs     12/28/24 2040   WBC 6.4   RBC 3.93*   HEMOGLOBIN 14.2   HEMATOCRIT 40.4*   .8*   MCH 36.1*   MCHC 35.1   RDW 11.2*   PLATELETCT 224   MPV 9.3     Recent Labs     12/28/24 2040   SODIUM 137   POTASSIUM 3.9   CHLORIDE 108*   CO2 17*   GLUCOSE 107*   BUN 16   CREATININE 0.9   CALCIUM 8.5*                   Imaging  US-RUQ    (Results Pending)         Assessment/Plan  Problem Representation:    34 years old male with a history of alcohol induced pancreatitis with multiple episodes admitted this time with acute on chronic pancreatitis due to alcohol and ongoing contact lens overwear. Patient on pancreas rest and following Ophthalmology recommendations.    * Alcohol-induced acute pancreatitis, unspecified complication status- (present on admission)  Assessment & Plan  Assessment: The patient presents with epigastric pain radiating to the back, consistent with previous episodes of pancreatitis, and elevated lipase levels (>800).  On physical examination the patient has diffuse abdominal pain more in the epigastrium, diffuse guarding, no ramos or maya signs and normal bowel sounds. Alcohol consumption is a known trigger. CT scan 2023 showed chronic pancreatitis. Previous abdominal ultrasound showed sludge.    -Clear liquid to rest the pancreas.  -IVF fluids until discontinue  -Pain management with narcotics  -Monitor lipase levels and liver function tests.  -RUQ US      Contact lens overwear  Assessment & Plan  Appreciate Ophthalmology recommendations  - Presents with 2 week history of left eye pain, tearing, and blurry vision. Treated for bacterial keratitis by outside ED  - Patient is a contact lens wearer with very poor contact lens hygiene  Recommendations  - Moxifloxacin q3h while awake  -  Erythromycin ointment qHS  - Patient instructed to discontinue contact lens use indefinitely  - Follow up as outpatient within 1-2 days after discharge.   - Please continue eyedrops upon discharge     Follow up: [facility] 1-2 days after discharge     Yariel Haynes MD    Financial difficulties- (present on admission)  Assessment & Plan  Assessment: Financial limitations impacting follow-up care and medication adherence.  Plan:  -Connect with  or case management to explore financial assistance options.  -Discuss patient assistance programs for medications.  -Provide information on community resources for low-cost or free healthcare services.    Substance abuse (HCC)- (present on admission)  Assessment & Plan  Patient reports using drugs. Mentioned injecting himself heroin to help with the pain prior admission.   History of alcohol abuse, currently drinking less, but not quit at all. Last Drink 12/28 (two shots of whiskey)  Smokes 1/2 pack a day  Plan:  -Refer to Case Management. Patient might benefit from addiction counseling and support groups (e.g., Alcoholics Anonymous).  -Nicotine patch      Unspecified corneal ulcer, left eye- (present on admission)  Assessment & Plan  See contact lens overwear    Alcohol abuse- (present on admission)  Assessment & Plan  Assessment: Ongoing alcohol consumption with elevated blood alcohol levels (0.14) and a history of alcohol abuse.  Plan:  -CIWA Protocol  -Vitamins  -Refer to Case Management. Patient might benefit from addiction counseling and support groups (e.g., Alcoholics Anonymous).  -Consider pharmacotherapy for alcohol use disorder if appropriate (e.g., naltrexone or acamprosate) as outpatient.    Tobacco use- (present on admission)  Assessment & Plan  Assessment: Patient states he smoke about 3-4 cigarettes in a day. However, he did smoke 2.5 PPD for about 15 years before slowing down in May 2024.  Plan:  -Nicotine Patch if needed.         VTE  prophylaxis: enoxaparin ppx    I have performed a physical exam and reviewed and updated ROS and Plan today (12/29/2024). In review of yesterday's note (12/28/2024), there are no changes except as documented above.

## 2024-12-29 NOTE — PROGRESS NOTES
Per Dr Marcum, pt is ok to have sandwich now and then clears afterwards. Pt requested from  when in room.

## 2024-12-29 NOTE — PROGRESS NOTES
[facility] DIRECT ADMISSION REPORT  Transferring facility: [facility]  Transferring physician: Kandi    Chief complaint: Abdominal pain, l vision loss to the left eye  Pertinent history & patient course: 34-year-old male with history of alcohol abuse, alcoholic pancreatitis, presented at outside hospital complains of epigastric abdominal pain, nausea for 2 weeks.  Additionally complains of left eye pain, redness and blurred vision.  He was seen prior on December 18 with left eye pain, diagnosed with left corneal ulcer, prescribed ofloxacin eyedrops, then seen on 12/20 years with left eye pain and noted that his ulcer appeared to be resolved.  He stopped taking ofloxacin a few days ago and has not been seen by ophthalmologist.  On exam today he has significant corneal edema and clouding, concern for iritis, keratitis and decreased vision to the left eye.  Requested transfer for ophthalmology consult  Pertinent imaging & lab results:   Consultants called prior to transfer and pertinent input from consultants:   Code Status:  per transferring provider, I personally verified with the transferring provider patient's code status and the transferring provider has confirmed this with the patient.  Reason for Transfer: For ophthalmology consult and for treatment for alcoholic pancreatitis  Further work up or recommendations requested prior to transfer:     Patient accepted for transfer: Yes  Accepting [facility] Facility: [facility] - Nursing to notify the Triage Coordinator in the RTOC via Voalte or Phone ext. [phone] when patient arrives to the unit. The Triage Coordinator will assign the admitting provider.    Consultants to be called upon arrival: Ophthalmology  Admission status: Inpatient.   Floor requested: MedSurg  If ICU transfer, name of intensivist case discussed with and pertinent input from critical care: N/A    The admitting provider is the point of contact for questions or concerns  regarding patient's care.

## 2024-12-29 NOTE — ASSESSMENT & PLAN NOTE
Assessment: The patient has low RBC and hematocrit levels, with elevated MCV, possibly related to alcohol use or nutritional deficiencies.  Plan:  -Order vitamin B12 and folate levels to assess for deficiencies.  -Monitor complete blood count (CBC) regularly.
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
## 2024-12-29 NOTE — CARE PLAN
Problem: Pain - Standard  Goal: Alleviation of pain or a reduction in pain to the patient’s comfort goal  Note: Will continue to medicate patient per MAR and will also encourage patient to practice some non -pharmacological interventions such as repositioning, purse lip breathing.      Problem: Optimal Care for Alcohol Withdrawal  Goal: Optimal Care for the alcohol withdrawal patient  Outcome: Progressing  Note: Follow CIWA-AR score protocol. Frequent reorientation . Monitor for fluid and electrolyte imbalance. Assess for respiratory depression due to sedation (pulse ox). Consider thiamine, multivitamins, folic acid and magnesium sulfate per provider order      Problem: Seizure Precautions  Goal: Implementation of seizure precautions  Note: All seizures precautions are in place, will continue to monitor.            The patient is Stable - Low risk of patient condition declining or worsening    Shift Goals  Clinical Goals: CIWA protocol, Q4h vitals, pain control  Patient Goals: pain control and comfort  Family Goals: N/A    Progress made toward(s) clinical / shift goals:      Patient is not progressing towards the following goals:

## 2024-12-29 NOTE — ASSESSMENT & PLAN NOTE
Assessment: Ongoing alcohol consumption with elevated blood alcohol levels (0.14) and a history of alcohol abuse.  Plan:  -[facility] Protocol  -Vitamins  -Refer to Case Management. Patient might benefit from addiction counseling and support groups (e.g., Alcoholics Anonymous).  -Consider pharmacotherapy for alcohol use disorder if appropriate (e.g., naltrexone or acamprosate) as outpatient.
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
## 2024-12-29 NOTE — PROGRESS NOTES
4 Eyes Skin Assessment Completed by sridhar RN and scotty RN.    Head Bruising  Ears WDL  Nose WDL  Mouth WDL  Neck WDL  Breast/Chest WDL  Shoulder Blades WDL  Spine WDL  (R) Arm/Elbow/Hand WDL  (L) Arm/Elbow/Hand Scar  Abdomen WDL  Groin WDL  Scrotum/Coccyx/Buttocks WDL  (R) Leg Redness, Scar, and Scab  (L) Leg Redness  (R) Heel/Foot/Toe Redness  (L) Heel/Foot/Toe Redness          Devices In Places Blood Pressure Cuff and Pulse Ox      Interventions In Place Pillows    Possible Skin Injury No    Pictures Uploaded Into Epic Yes  Wound Consult Placed N/A  RN Wound Prevention Protocol Ordered No

## 2024-12-29 NOTE — ASSESSMENT & PLAN NOTE
Assessment: Patient states he smoke about 3-4 cigarettes in a day. However, he did smoke 2.5 PPD for about 15 years before slowing down in May 2024.  Plan:  -Nicotine Patch if needed.

## 2024-12-29 NOTE — ASSESSMENT & PLAN NOTE
Appreciate Ophthalmology recommendations  - Presents with 2 week history of left eye pain, tearing, and blurry vision. Treated for bacterial keratitis by outside ED  - Patient is a contact lens wearer with very poor contact lens hygiene  Recommendations  - Moxifloxacin q3h while awake  - Erythromycin ointment qHS  - Patient instructed to discontinue contact lens use indefinitely  - Follow up as outpatient within 1-2 days after discharge.   - Please continue eyedrops upon discharge     Follow up: [facility] 1-2 days after discharge     Yariel Haynes MD

## 2024-12-29 NOTE — ASSESSMENT & PLAN NOTE
Assessment: The patient has severe pain, redness, and vision loss in the left eye after discontinuing ofloxacin due to financial constraints. Vision loss has been for several days.  Plan:  -Ophthalmology consultation for evaluation and treatment to be made by the daty team.   -Reinstitute topical antibiotic therapy as per ophthalmologist's recommendation.  -Consider financial assistance resources or patient assistance programs for medication access.

## 2024-12-29 NOTE — ASSESSMENT & PLAN NOTE
Assessment: The patient presents with epigastric pain radiating to the back, consistent with previous episodes of pancreatitis, and elevated lipase levels (>800).  On physical examination the patient has diffuse abdominal pain more in the epigastrium, diffuse guarding, no ramos or maya signs and normal bowel sounds. Alcohol consumption is a known trigger. CT scan 2023 showed chronic pancreatitis. Previous abdominal ultrasound showed sludge.    -Clear liquid to rest the pancreas.  -IVF fluids until discontinue  -Pain management with narcotics  -Monitor lipase levels and liver function tests.  -RUQ US

## 2024-12-29 NOTE — PROGRESS NOTES
Pt arrived via EMS approximately 0030. A&ox4 packet rec'd from EMS. Notified of admission and resident. Pt had an empty bottle of krystin pickard that was discarded but stated nothing else on person.

## 2024-12-29 NOTE — CARE PLAN
The patient is Watcher - Medium risk of patient condition declining or worsening    Shift Goals  Clinical Goals: npo, vitals, abx  Patient Goals: to eat  Family Goals: TISH    Progress made toward(s) clinical / shift goals:        Problem: Pain - Standard  Goal: Alleviation of pain or a reduction in pain to the patient’s comfort goal  Outcome: Progressing     Problem: Optimal Care for Alcohol Withdrawal  Goal: Optimal Care for the alcohol withdrawal patient  Outcome: Progressing     Problem: Seizure Precautions  Goal: Implementation of seizure precautions  Outcome: Progressing     Problem: Psychosocial  Goal: Patient's level of anxiety will decrease  Outcome: Progressing  Goal: Spiritual and cultural needs incorporated into hospitalization  Outcome: Progressing     Problem: Risk for Aspiration  Goal: Patient's risk for aspiration will be absent or decrease  Outcome: Progressing       Patient is not progressing towards the following goals:

## 2024-12-30 VITALS
HEIGHT: 71 IN | HEART RATE: 56 BPM | OXYGEN SATURATION: 98 % | WEIGHT: 170.42 LBS | RESPIRATION RATE: 18 BRPM | TEMPERATURE: 97.1 F | DIASTOLIC BLOOD PRESSURE: 82 MMHG | SYSTOLIC BLOOD PRESSURE: 123 MMHG | BODY MASS INDEX: 23.86 KG/M2

## 2024-12-30 LAB
ALBUMIN SERPL BCP-MCNC: 3.6 G/DL (ref 3.2–4.9)
ALBUMIN/GLOB SERPL: 1.2 G/DL
ALP SERPL-CCNC: 71 U/L (ref 30–99)
ALT SERPL-CCNC: 10 U/L (ref 2–50)
ANION GAP SERPL CALC-SCNC: 9 MMOL/L (ref 7–16)
AST SERPL-CCNC: 18 U/L (ref 12–45)
BASOPHILS # BLD AUTO: 0.6 % (ref 0–1.8)
BASOPHILS # BLD: 0.03 K/UL (ref 0–0.12)
BILIRUB SERPL-MCNC: 0.7 MG/DL (ref 0.1–1.5)
BUN SERPL-MCNC: 7 MG/DL (ref 8–22)
CALCIUM ALBUM COR SERPL-MCNC: 8.7 MG/DL (ref 8.5–10.5)
CALCIUM SERPL-MCNC: 8.4 MG/DL (ref 8.5–10.5)
CHLORIDE SERPL-SCNC: 107 MMOL/L (ref 96–112)
CO2 SERPL-SCNC: 22 MMOL/L (ref 20–33)
CREAT SERPL-MCNC: 0.79 MG/DL (ref 0.5–1.4)
EOSINOPHIL # BLD AUTO: 0.18 K/UL (ref 0–0.51)
EOSINOPHIL NFR BLD: 3.8 % (ref 0–6.9)
ERYTHROCYTE [DISTWIDTH] IN BLOOD BY AUTOMATED COUNT: 43.2 FL (ref 35.9–50)
GFR SERPLBLD CREATININE-BSD FMLA CKD-EPI: 120 ML/MIN/1.73 M 2
GLOBULIN SER CALC-MCNC: 2.9 G/DL (ref 1.9–3.5)
GLUCOSE SERPL-MCNC: 73 MG/DL (ref 65–99)
HCT VFR BLD AUTO: 40.5 % (ref 42–52)
HGB BLD-MCNC: 13.6 G/DL (ref 14–18)
IMM GRANULOCYTES # BLD AUTO: 0.01 K/UL (ref 0–0.11)
IMM GRANULOCYTES NFR BLD AUTO: 0.2 % (ref 0–0.9)
LYMPHOCYTES # BLD AUTO: 1.88 K/UL (ref 1–4.8)
LYMPHOCYTES NFR BLD: 39.7 % (ref 22–41)
MCH RBC QN AUTO: 35.1 PG (ref 27–33)
MCHC RBC AUTO-ENTMCNC: 33.6 G/DL (ref 32.3–36.5)
MCV RBC AUTO: 104.7 FL (ref 81.4–97.8)
MONOCYTES # BLD AUTO: 0.57 K/UL (ref 0–0.85)
MONOCYTES NFR BLD AUTO: 12.1 % (ref 0–13.4)
NEUTROPHILS # BLD AUTO: 2.06 K/UL (ref 1.82–7.42)
NEUTROPHILS NFR BLD: 43.6 % (ref 44–72)
NRBC # BLD AUTO: 0 K/UL
NRBC BLD-RTO: 0 /100 WBC (ref 0–0.2)
PLATELET # BLD AUTO: 189 K/UL (ref 164–446)
PMV BLD AUTO: 9.8 FL (ref 9–12.9)
POTASSIUM SERPL-SCNC: 4.1 MMOL/L (ref 3.6–5.5)
PROT SERPL-MCNC: 6.5 G/DL (ref 6–8.2)
RBC # BLD AUTO: 3.87 M/UL (ref 4.7–6.1)
SODIUM SERPL-SCNC: 138 MMOL/L (ref 135–145)
WBC # BLD AUTO: 4.7 K/UL (ref 4.8–10.8)

## 2024-12-30 PROCEDURE — 700105 HCHG RX REV CODE 258: Performed by: INTERNAL MEDICINE

## 2024-12-30 PROCEDURE — 700111 HCHG RX REV CODE 636 W/ 250 OVERRIDE (IP)

## 2024-12-30 PROCEDURE — 80053 COMPREHEN METABOLIC PANEL: CPT

## 2024-12-30 PROCEDURE — 85025 COMPLETE CBC W/AUTO DIFF WBC: CPT

## 2024-12-30 PROCEDURE — 99239 HOSP IP/OBS DSCHRG MGMT >30: CPT | Performed by: INTERNAL MEDICINE

## 2024-12-30 PROCEDURE — 700105 HCHG RX REV CODE 258

## 2024-12-30 PROCEDURE — A9270 NON-COVERED ITEM OR SERVICE: HCPCS | Performed by: INTERNAL MEDICINE

## 2024-12-30 PROCEDURE — A9270 NON-COVERED ITEM OR SERVICE: HCPCS

## 2024-12-30 PROCEDURE — 700102 HCHG RX REV CODE 250 W/ 637 OVERRIDE(OP): Performed by: INTERNAL MEDICINE

## 2024-12-30 PROCEDURE — 700102 HCHG RX REV CODE 250 W/ 637 OVERRIDE(OP)

## 2024-12-30 RX ORDER — ERYTHROMYCIN 5 MG/G
1 OINTMENT OPHTHALMIC
Qty: 3.5 G | Refills: 0 | Status: ACTIVE | OUTPATIENT
Start: 2024-12-30

## 2024-12-30 RX ORDER — MOXIFLOXACIN 5 MG/ML
1 SOLUTION/ DROPS OPHTHALMIC 3 TIMES DAILY
Qty: 3 ML | Refills: 0 | Status: ACTIVE | OUTPATIENT
Start: 2024-12-30

## 2024-12-30 RX ORDER — ERYTHROMYCIN 5 MG/G
1 OINTMENT OPHTHALMIC
Qty: 3.5 G | Refills: 0 | Status: ACTIVE
Start: 2024-12-30 | End: 2024-12-30

## 2024-12-30 RX ORDER — OXYCODONE HYDROCHLORIDE 5 MG/1
5 TABLET ORAL EVERY 6 HOURS PRN
Qty: 12 TABLET | Refills: 0 | Status: SHIPPED
Start: 2024-12-30 | End: 2024-12-30

## 2024-12-30 RX ORDER — OXYCODONE HYDROCHLORIDE 5 MG/1
5 TABLET ORAL EVERY 6 HOURS PRN
Qty: 12 TABLET | Refills: 0 | Status: SHIPPED | OUTPATIENT
Start: 2024-12-30 | End: 2025-01-02

## 2024-12-30 RX ORDER — MOXIFLOXACIN 5 MG/ML
1 SOLUTION/ DROPS OPHTHALMIC
Qty: 3 ML | Refills: 0 | Status: ACTIVE
Start: 2024-12-30 | End: 2024-12-30

## 2024-12-30 RX ADMIN — OXYCODONE HYDROCHLORIDE 10 MG: 10 TABLET ORAL at 09:38

## 2024-12-30 RX ADMIN — OXYCODONE HYDROCHLORIDE 10 MG: 10 TABLET ORAL at 04:11

## 2024-12-30 RX ADMIN — NICOTINE TRANSDERMAL SYSTEM 21 MG: 21 PATCH, EXTENDED RELEASE TRANSDERMAL at 04:06

## 2024-12-30 RX ADMIN — MOXIFLOXACIN 1 DROP: 5 SOLUTION/ DROPS OPHTHALMIC at 14:30

## 2024-12-30 RX ADMIN — THIAMINE HYDROCHLORIDE 400 MG: 100 INJECTION, SOLUTION INTRAMUSCULAR; INTRAVENOUS at 09:34

## 2024-12-30 RX ADMIN — OXYCODONE 5 MG: 5 TABLET ORAL at 16:04

## 2024-12-30 RX ADMIN — SODIUM CHLORIDE, POTASSIUM CHLORIDE, SODIUM LACTATE AND CALCIUM CHLORIDE: 600; 310; 30; 20 INJECTION, SOLUTION INTRAVENOUS at 01:25

## 2024-12-30 RX ADMIN — THERA TABS 1 TABLET: TAB at 04:05

## 2024-12-30 ASSESSMENT — LIFESTYLE VARIABLES
ANXIETY: NO ANXIETY (AT EASE)
TREMOR: TREMOR NOT VISIBLE BUT CAN BE FELT, FINGERTIP TO FINGERTIP
HEADACHE, FULLNESS IN HEAD: NOT PRESENT
TOTAL SCORE: 1
AGITATION: NORMAL ACTIVITY
TOTAL SCORE: 4
AUDITORY DISTURBANCES: NOT PRESENT
PAROXYSMAL SWEATS: NO SWEAT VISIBLE
NAUSEA AND VOMITING: NO NAUSEA AND NO VOMITING
PAROXYSMAL SWEATS: NO SWEAT VISIBLE
VISUAL DISTURBANCES: MILD SENSITIVITY
NAUSEA AND VOMITING: NO NAUSEA AND NO VOMITING
ORIENTATION AND CLOUDING OF SENSORIUM: ORIENTED AND CAN DO SERIAL ADDITIONS
ORIENTATION AND CLOUDING OF SENSORIUM: ORIENTED AND CAN DO SERIAL ADDITIONS
HEADACHE, FULLNESS IN HEAD: VERY MILD
AGITATION: NORMAL ACTIVITY
VISUAL DISTURBANCES: NOT PRESENT
AUDITORY DISTURBANCES: NOT PRESENT
ANXIETY: NO ANXIETY (AT EASE)
TREMOR: TREMOR NOT VISIBLE BUT CAN BE FELT, FINGERTIP TO FINGERTIP

## 2024-12-30 ASSESSMENT — PAIN DESCRIPTION - PAIN TYPE
TYPE: ACUTE PAIN

## 2024-12-30 ASSESSMENT — SOCIAL DETERMINANTS OF HEALTH (SDOH)
WITHIN THE LAST YEAR, HAVE YOU BEEN HUMILIATED OR EMOTIONALLY ABUSED IN OTHER WAYS BY YOUR PARTNER OR EX-PARTNER?: PATIENT DECLINED
IN THE PAST 12 MONTHS, HAS THE ELECTRIC, GAS, OIL, OR WATER COMPANY THREATENED TO SHUT OFF SERVICE IN YOUR HOME?: YES
WITHIN THE PAST 12 MONTHS, YOU WORRIED THAT YOUR FOOD WOULD RUN OUT BEFORE YOU GOT THE MONEY TO BUY MORE: SOMETIMES TRUE
WITHIN THE LAST YEAR, HAVE YOU BEEN KICKED, HIT, SLAPPED, OR OTHERWISE PHYSICALLY HURT BY YOUR PARTNER OR EX-PARTNER?: PATIENT DECLINED
WITHIN THE PAST 12 MONTHS, THE FOOD YOU BOUGHT JUST DIDN'T LAST AND YOU DIDN'T HAVE MONEY TO GET MORE: SOMETIMES TRUE
WITHIN THE LAST YEAR, HAVE TO BEEN RAPED OR FORCED TO HAVE ANY KIND OF SEXUAL ACTIVITY BY YOUR PARTNER OR EX-PARTNER?: PATIENT DECLINED
WITHIN THE LAST YEAR, HAVE YOU BEEN AFRAID OF YOUR PARTNER OR EX-PARTNER?: PATIENT DECLINED

## 2024-12-30 NOTE — DISCHARGE PLANNING
Complex Case Management    Order received for Complex Case Management. Patient's chart has been reviewed.     Complex case management following? No    No: Discharge planning recommendations are to have floor case management complete assessment and discharge plan based on needs identified. Consult will be cancelled. Please re-consult as needed.    NOTE: There may be cases that are NOT assigned to a CCM but will be followed for progression of care by leaders of the Complex Discharge Committee.

## 2024-12-30 NOTE — CARE PLAN
The patient is Stable - Low risk of patient condition declining or worsening    Shift Goals  Clinical Goals: CIWA protocol; pain management;  Patient Goals: pain management  Family Goals: TISH    Progress made toward(s) clinical / shift goals:    Problem: Pain - Standard  Goal: Alleviation of pain or a reduction in pain to the patient’s comfort goal  Outcome: Progressing  Note: With complaints of L eye and epigastric pain. PRN oxycodone given.      Problem: Optimal Care for Alcohol Withdrawal  Goal: Optimal Care for the alcohol withdrawal patient  Outcome: Progressing  Note: CIWA scores; 1-4. Seizure precautions in place.

## 2024-12-30 NOTE — DISCHARGE SUMMARY
[facility] Internal Medicine Discharge Summary    Attending: Olayinka Ramirez M.d.  Senior Resident: Dr. Zuleta  Intern:  Dr. Fish  Contact Number: [phone]    CHIEF COMPLAINT ON ADMISSION  Gay Storm is a 34 y.o. male admitted 12/29/2024 with abdominal pain and visual loss on the left eye.     Reason for Admission  Corneal ulcer and decreased vision on left eye, Alcoholic acute Pancreatitis  Admission Date  12/29/2024    CODE STATUS  Full Code    HPI & HOSPITAL COURSE    Gay Storm is a 34 years old male who recently presented with epigastric abdominal pain. He mentioned that he has been drinking less that usual but had two shots of whiskey the day of admission. He rated the pain as 7/10 constant and radiating to the back, similar to previous episodes, that intensified the day of admission. He has also been having nausea for 2 weeks and decreased appetite for a week but denies vomiting, chest pain, dyspnea, fever, or hematochezia. .    He has a past medical history of chronic alcoholic pancreatitis with several admissions for acute pancreatitis due to alcohol. On further chart review, there were multiples ED visits without admission per patients preference to not get admitted. Previous abdominal CT scans show chronic changes of chronic pancreatitis.     His other concern was severe left eye pain and vision loss. The patient initially visited [facility]'s emergency department on December 18th, reporting left eye pain and requesting a wound check. At that time, he was diagnosed with a left corneal ulcer and prescribed ofloxacin eye drops. Now experiences significant redness, pain, and vision loss, describing it as a cloud over his vision in the left eye. He has not been able to follow up with ophthalmology or optometry due to financial constraints. He previously wore contact lenses but has not used a contact in his left eye since his initial ED visit for the corneal ulcer.     In the ED lipase was  857 and alcohol levels 0.14 and was admitted for acute pancreatitis management with clear liquid diet, narcotics for pain and IV fluids. He was also placed on CIWA protocols with multivitamins. Ophthalmology was consulted and mentioned that the cause was contact lenses overwear and poor hygiene, discontinued the anesthetic and started on moxifloxacin and erythromycin eye drops and recommended follow up with him a day after discharge, patient continued to have watery discharge and Vasc 20/800 at near on the left eye. Patient's pain improved and advanced diet as tolerated. RUQ US did not show gallstones. Instructed that the likely cause of the acute on chronic pancreatitis episodes are related to alcohol, patient understood and mentioned that will stop drinking.     Therefore, he is discharged in good and stable condition to home with close outpatient follow-up.    The patient met 2-midnight criteria for an inpatient stay at the time of discharge.    Discharge Date  12/30/2024    Physical Exam on Day of Discharge  Physical Exam  Constitutional:       Appearance: Normal appearance. He is normal weight.   HENT:      Head: Normocephalic.      Nose: Nose normal.      Mouth/Throat:      Pharynx: Oropharynx is clear.   Eyes:      General:         Left eye: Discharge present.     Comments: Left eye pain and cloudiness, there's bilateral conjunctival injection, he can also only see shapes with the left eye.   He uses contact lenses.   Visual acuity was 20/25 on the right eye and <20/800 on the left.   Cardiovascular:      Rate and Rhythm: Normal rate and regular rhythm.   Pulmonary:      Effort: Pulmonary effort is normal.      Breath sounds: Normal breath sounds.   Abdominal:      General: Abdomen is flat. Bowel sounds are normal. There is no distension.      Palpations: There is no mass.      Tenderness: There is abdominal tenderness (Epigastrium, but diffuse). There is guarding. There is no rebound.   Musculoskeletal:          General: Normal range of motion.      Cervical back: Normal range of motion.   Skin:     General: Skin is warm.      Capillary Refill: Capillary refill takes less than 2 seconds.   Neurological:      General: No focal deficit present.      Mental Status: He is alert and oriented to person, place, and time. Mental status is at baseline.   Psychiatric:         Mood and Affect: Mood normal.         Behavior: Behavior normal.         Thought Content: Thought content normal.         Judgment: Judgment normal.         FOLLOW UP ITEMS POST DISCHARGE  Ophthalmology    DISCHARGE DIAGNOSES  Principal Problem:    Alcohol-induced acute pancreatitis, unspecified complication status (POA: Yes)  Active Problems:    Contact lens overwear (POA: Unknown)    Tobacco use (POA: Yes)    Alcohol abuse (Chronic) (POA: Yes)    Unspecified corneal ulcer, left eye (POA: Yes)    Substance abuse (HCC) (POA: Yes)    Financial difficulties (POA: Yes)  Resolved Problems:    Keratitis, left (POA: Yes)    Anemia (POA: Yes)      FOLLOW UP  No future appointments.  No follow-up provider specified.    MEDICATIONS ON DISCHARGE     Medication List        START taking these medications        Instructions   erythromycin 5 MG/GM Oint   Apply 1 Application to left eye at bedtime.  Dose: 1 Application     moxifloxacin 0.5 % Soln  Commonly known as: Vigamox   Administer 1 Drop into the left eye 6 Times a Day.  Dose: 1 Drop     oxyCODONE immediate-release 5 MG Tabs  Commonly known as: Roxicodone   Take 1 Tablet by mouth every 6 hours as needed for Severe Pain for up to 3 days.  Dose: 5 mg              Allergies  No Known Allergies    DIET  Orders Placed This Encounter   Procedures    Diet Order Diet: Full Liquid     Standing Status:   Standing     Number of Occurrences:   1     Order Specific Question:   Diet:     Answer:   Full Liquid [11]       ACTIVITY  As tolerated.  Weight bearing as  tolerated    CONSULTATIONS  Ophthlmology    PROCEDURES  None    LABORATORY  Lab Results   Component Value Date    SODIUM 138 12/30/2024    POTASSIUM 4.1 12/30/2024    CHLORIDE 107 12/30/2024    CO2 22 12/30/2024    GLUCOSE 73 12/30/2024    BUN 7 (L) 12/30/2024    CREATININE 0.79 12/30/2024        Lab Results   Component Value Date    WBC 4.7 (L) 12/30/2024    HEMOGLOBIN 13.6 (L) 12/30/2024    HEMATOCRIT 40.5 (L) 12/30/2024    PLATELETCT 189 12/30/2024        Total time of the discharge process exceeds 36 minutes.

## 2024-12-30 NOTE — DISCHARGE INSTRUCTIONS
-Avoid alcohol, this is likely the cause of your episodes of pancreatitis  -Sending oxycodone 5 mg for a few days for pain  -Continue with the eye drops and follow up with ophthalmology tomorrow  -Advance diet slowly

## 2024-12-30 NOTE — CARE PLAN
The patient is Stable - Low risk of patient condition declining or worsening    Shift Goals  Clinical Goals: monitor diet tolerance; pain; vision  Patient Goals: rest  Family Goals: pooja    Progress made toward(s) clinical / shift goals:        Problem: Knowledge Deficit - Standard  Goal: Patient and family/care givers will demonstrate understanding of plan of care, disease process/condition, diagnostic tests and medications  Description: Target End Date:  1-3 days or as soon as patient condition allows    Document in Patient Education    1.  Patient and family/caregiver oriented to unit, equipment, visitation policy and means for communicating concern  2.  Complete/review Learning Assessment  3.  Assess knowledge level of disease process/condition, treatment plan, diagnostic tests and medications  4.  Explain disease process/condition, treatment plan, diagnostic tests and medications  Outcome: Progressing     Problem: Pain - Standard  Goal: Alleviation of pain or a reduction in pain to the patient’s comfort goal  Description: Target End Date:  Prior to discharge or change in level of care    Document on Vitals flowsheet    1.  Document pain using the appropriate pain scale per order or unit policy  2.  Educate and implement non-pharmacologic comfort measures (i.e. relaxation, distraction, massage, cold/heat therapy, etc.)  3.  Pain management medications as ordered  4.  Reassess pain after pain med administration per policy  5.  If opiods administered assess patient's response to pain medication is appropriate per POSS sedation scale  6.  Follow pain management plan developed in collaboration with patient and interdisciplinary team (including palliative care or pain specialists if applicable)  Outcome: Progressing       Patient is not progressing towards the following goals:

## 2024-12-30 NOTE — DISCHARGE PLANNING
Case Management Discharge Planning    Admission Date: 12/29/2024  GMLOS: 2.3  ALOS: 1    6-Clicks ADL Score: 24  6-Clicks Mobility Score: 24      Anticipated Discharge Dispo:      DME Needed: No    Action(s) Taken: LMSW receieved a note from the doctor saying pt needs a place to stay tonight. Per  leadership we would be able to get pt to the shelter for the night. LMSW informed patient of this. Pt reported the has texted a couple friends but has not heard back from them. LMSW told pt to let her know if he needs help getting to the shelter. Pt expressed understanding and mentioned staying in the hospital for another night.     1540  LMSW provided bedside RN with taxi voucher for pt to get to [facility].    Escalations Completed: None    Medically Clear: Yes    Next Steps: LMSW to continue to assist with DC needs/barriers.     Barriers to Discharge: None    Is the patient up for discharge tomorrow: Yes    Is transport arranged for discharge disposition: No

## 2024-12-31 NOTE — PROGRESS NOTES
Pt discharged to [facility] via wheelchair and transport. Pt given opportunity to ask questions. Pt left unit with PIV in place. Pt declined to receive [facility] due to cost. Pt understands he will discharge without pain medication and is still agreeable to leave. Pt given cab voucher from  and understands follow-up and POC after discharge.

## 2024-12-31 NOTE — PROGRESS NOTES
Discharge orders received.  Patient arrived to the discharge lounge.  PIV removed.  Instructions given, medications reviewed and general discharge education provided to patient.  Follow up appointments discussed.  Patient verbalized understanding of dc instructions and prescriptions.  Patient signed discharge instructions.  Patient verbalized understanding had all belongings with him. Patient refusing oxycodone prescription and MD has been notified, pharmacy aware. Patient offered taxi voucher to shelter, pt refused. States he wants to stay near [facility] as his ophthalmology appt is closer. Eye drop medications sent to [facility]. Wished patient a speedy recovery.